# Patient Record
Sex: FEMALE
[De-identification: names, ages, dates, MRNs, and addresses within clinical notes are randomized per-mention and may not be internally consistent; named-entity substitution may affect disease eponyms.]

---

## 2021-10-24 ENCOUNTER — HOSPITAL ENCOUNTER (EMERGENCY)
Dept: HOSPITAL 5 - ED | Age: 31
LOS: 1 days | Discharge: HOME | End: 2021-10-25
Payer: MEDICAID

## 2021-10-24 VITALS — SYSTOLIC BLOOD PRESSURE: 102 MMHG | DIASTOLIC BLOOD PRESSURE: 61 MMHG

## 2021-10-24 DIAGNOSIS — G44.209: Primary | ICD-10-CM

## 2021-10-24 DIAGNOSIS — L72.12: ICD-10-CM

## 2021-10-24 DIAGNOSIS — Z79.899: ICD-10-CM

## 2021-10-24 PROCEDURE — 99282 EMERGENCY DEPT VISIT SF MDM: CPT

## 2021-10-24 NOTE — EMERGENCY DEPARTMENT REPORT
ED Headache HPI





- General


Chief Complaint: Headache


Stated Complaint: HEAD PAIN


Time Seen by Provider: 10/24/21 23:29


Source: patient


Exam Limitations: no limitations





- History of Present Illness


Initial Comments: 





Chief complaint: I have a knot on my head.  I had a concussion





HPI: This is a 31-year-old female with history of head injury 1 year ago 

presents with 1 month of left temporal headache and a "knot" on the back of her 

head.  1 year ago she had a concussion due to head trauma.  She is concerned 

that she may have internal bleeding from this remote injury.  She request CT 

head scan.  Patient had a gradual onset of 1 month of left temporal headache.  

She denies blurry vision denies neck pain denies vomiting.  She has a cyst on 

her scalp.  She is taking multiple over-the-counter medications include Goody's 

powder.  Headache is mild dull throbbing


Timing/Duration: other (1 month)


Quality: mild


Recent Head Trauma: frequent headaches


Associated Symptoms: other (Scalp cyst)


Home Medications: 


Ambulatory Orders





Butalb/Acetaminophen/Caffeine [Fioricet -40 mg CAP] 1 cap PO Q6HR PRN #10 

cap 10/24/21 


Ibuprofen [Motrin 400 MG tab] 400 mg PO Q8H PRN #10 tablet 10/24/21 











ED Review of Systems


ROS: 


Stated complaint: HEAD PAIN


Other details as noted in HPI





Comment: All other systems reviewed and negative


Constitutional: denies: chills, fever, malaise


Respiratory: denies: cough, shortness of breath


Cardiovascular: denies: chest pain


Gastrointestinal: denies: abdominal pain, nausea, vomiting


Skin: lesions


Neurological: headache.  denies: numbness, paresthesias, confusion, abnormal 

gait





ED Past Medical Hx





- Past Medical History


Previous Medical History?: No





- Surgical History


Past Surgical History?: No





- Medications


Home Medications: 


                                Home Medications











 Medication  Instructions  Recorded  Confirmed  Last Taken  Type


 


Butalb/Acetaminophen/Caffeine 1 cap PO Q6HR PRN #10 cap 10/24/21  Unknown Rx





[Fioricet -40 mg CAP]     


 


Ibuprofen [Motrin 400 MG tab] 400 mg PO Q8H PRN #10 tablet 10/24/21  Unknown Rx














ED Physical Exam





- General


Limitations: No Limitations


General appearance: alert, in no apparent distress





- Head


Head exam: Present: atraumatic, normocephalic, other (Small 1 cm cyst with 

overlying scab no purulence no erythema)





- Eye


Eye exam: Present: normal appearance





- ENT


ENT exam: Present: mucous membranes moist





- Neck


Neck exam: Present: normal inspection, full ROM





- Respiratory


Respiratory exam: Present: normal lung sounds bilaterally.  Absent: respiratory 

distress, wheezes, rales, rhonchi





- Cardiovascular


Cardiovascular Exam: Present: regular rate, normal rhythm, normal heart sounds. 

Absent: systolic murmur, diastolic murmur, rubs, gallop





- GI/Abdominal


GI/Abdominal exam: Present: soft, normal bowel sounds.  Absent: distended, 

tenderness, guarding, rebound





- Extremities Exam


Extremities exam: Present: normal inspection





- Neurological Exam


Neurological exam: Present: alert, oriented X3, normal gait, other (Appears 

comfortable eating food)





- Psychiatric


Psychiatric exam: Present: normal affect, normal mood





- Skin


Skin exam: Present: warm, dry, intact, normal color.  Absent: rash





ED Course





                                   Vital Signs











  10/24/21





  23:25


 


Temperature 98.3 F


 


Pulse Rate 82


 


Respiratory 16





Rate 


 


Blood Pressure 102/61


 


O2 Sat by Pulse 97





Oximetry 














ED Medical Decision Making





- Medical Decision Making





1.  Tension headache: No red flags such as sudden onset neurological deficit 

mild in nature prescribed ibuprofen and Fioricet





2.  dermoid cyst of scalp no further treatment needed





Critical care attestation.: 


If time is entered above; I have spent that time in minutes in the direct care 

of this critically ill patient, excluding procedure time.








ED Disposition


Clinical Impression: 


 Tension headache, Scalp cyst





Disposition: 01 HOME / SELF CARE / HOMELESS


Is pt being admited?: No


Does the pt Need Aspirin: No


Condition: Stable


Instructions:  Tension Headache, Adult, Easy-to-Read


Prescriptions: 


Butalb/Acetaminophen/Caffeine [Fioricet -40 mg CAP] 1 cap PO Q6HR PRN #10 

cap


 PRN Reason: Headache


Ibuprofen [Motrin 400 MG tab] 400 mg PO Q8H PRN #10 tablet


 PRN Reason: Pain , Severe (7-10)


Referrals: 


DOROTHEA WOODSON MD [Referring] - 3-5 Days

## 2022-01-02 ENCOUNTER — HOSPITAL ENCOUNTER (EMERGENCY)
Dept: HOSPITAL 5 - ED | Age: 32
Discharge: LEFT BEFORE DISCHARGE | End: 2022-01-02
Payer: MEDICAID

## 2022-01-02 VITALS — SYSTOLIC BLOOD PRESSURE: 107 MMHG | DIASTOLIC BLOOD PRESSURE: 73 MMHG

## 2022-01-02 DIAGNOSIS — Z53.21: ICD-10-CM

## 2022-01-02 DIAGNOSIS — T16.2XXA: Primary | ICD-10-CM
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
## 2022-01-03 ENCOUNTER — HOSPITAL ENCOUNTER (EMERGENCY)
Dept: HOSPITAL 5 - ED | Age: 32
LOS: 1 days | Discharge: LEFT BEFORE DISCHARGE | End: 2022-01-04
Payer: SELF-PAY

## 2022-01-03 DIAGNOSIS — Z53.21: ICD-10-CM

## 2022-01-03 DIAGNOSIS — T16.9XXA: Primary | ICD-10-CM

## 2022-05-27 ENCOUNTER — HOSPITAL ENCOUNTER (EMERGENCY)
Dept: HOSPITAL 5 - ED | Age: 32
LOS: 1 days | End: 2022-05-28
Payer: SELF-PAY

## 2022-05-27 VITALS — SYSTOLIC BLOOD PRESSURE: 135 MMHG | DIASTOLIC BLOOD PRESSURE: 86 MMHG

## 2022-05-27 DIAGNOSIS — Z53.21: ICD-10-CM

## 2022-05-27 DIAGNOSIS — R10.9: Primary | ICD-10-CM
